# Patient Record
Sex: MALE | Race: WHITE
[De-identification: names, ages, dates, MRNs, and addresses within clinical notes are randomized per-mention and may not be internally consistent; named-entity substitution may affect disease eponyms.]

---

## 2020-11-06 ENCOUNTER — HOSPITAL ENCOUNTER (OUTPATIENT)
Dept: HOSPITAL 96 - M.CL | Age: 40
End: 2020-11-06
Attending: INTERNAL MEDICINE
Payer: COMMERCIAL

## 2020-11-06 VITALS — DIASTOLIC BLOOD PRESSURE: 71 MMHG | SYSTOLIC BLOOD PRESSURE: 116 MMHG

## 2020-11-06 VITALS — SYSTOLIC BLOOD PRESSURE: 111 MMHG | DIASTOLIC BLOOD PRESSURE: 78 MMHG

## 2020-11-06 VITALS — SYSTOLIC BLOOD PRESSURE: 115 MMHG | DIASTOLIC BLOOD PRESSURE: 75 MMHG

## 2020-11-06 VITALS — SYSTOLIC BLOOD PRESSURE: 17 MMHG | DIASTOLIC BLOOD PRESSURE: 72 MMHG

## 2020-11-06 VITALS — BODY MASS INDEX: 27.13 KG/M2 | WEIGHT: 179 LBS | HEIGHT: 68 IN

## 2020-11-06 VITALS — DIASTOLIC BLOOD PRESSURE: 88 MMHG | SYSTOLIC BLOOD PRESSURE: 118 MMHG

## 2020-11-06 VITALS — SYSTOLIC BLOOD PRESSURE: 129 MMHG | DIASTOLIC BLOOD PRESSURE: 76 MMHG

## 2020-11-06 VITALS — DIASTOLIC BLOOD PRESSURE: 55 MMHG | SYSTOLIC BLOOD PRESSURE: 109 MMHG

## 2020-11-06 VITALS — SYSTOLIC BLOOD PRESSURE: 120 MMHG | DIASTOLIC BLOOD PRESSURE: 78 MMHG

## 2020-11-06 DIAGNOSIS — I35.1: ICD-10-CM

## 2020-11-06 DIAGNOSIS — Z98.890: ICD-10-CM

## 2020-11-06 DIAGNOSIS — R01.1: ICD-10-CM

## 2020-11-06 DIAGNOSIS — Z20.828: ICD-10-CM

## 2020-11-06 DIAGNOSIS — Z79.899: ICD-10-CM

## 2020-11-06 DIAGNOSIS — R07.1: Primary | ICD-10-CM

## 2020-11-06 DIAGNOSIS — F17.210: ICD-10-CM

## 2020-11-06 DIAGNOSIS — E78.00: ICD-10-CM

## 2020-11-06 DIAGNOSIS — Z72.89: ICD-10-CM

## 2020-11-06 LAB
ALBUMIN SERPL-MCNC: 4 G/DL (ref 3.4–5)
ALP SERPL-CCNC: 88 U/L (ref 46–116)
ALT SERPL-CCNC: 44 U/L (ref 30–65)
ANION GAP SERPL CALC-SCNC: 8 MMOL/L (ref 7–16)
APTT BLD: 25.2 SECONDS (ref 25–31.3)
AST SERPL-CCNC: 26 U/L (ref 15–37)
BILIRUB SERPL-MCNC: 0.6 MG/DL
BUN SERPL-MCNC: 14 MG/DL (ref 7–18)
CALCIUM SERPL-MCNC: 8.9 MG/DL (ref 8.5–10.1)
CHLORIDE SERPL-SCNC: 104 MMOL/L (ref 98–107)
CHOLEST SERPL-MCNC: 226 MG/DL (ref ?–200)
CO2 SERPL-SCNC: 27 MMOL/L (ref 21–32)
CREAT SERPL-MCNC: 1 MG/DL (ref 0.6–1.3)
GLUCOSE SERPL-MCNC: 96 MG/DL (ref 70–99)
HCT VFR BLD CALC: 47.2 % (ref 42–52)
HDLC SERPL-MCNC: 37 MG/DL (ref 40–?)
HGB BLD-MCNC: 16.2 GM/DL (ref 14–18)
INR PPP: 1
LDLC SERPL-MCNC: 161 MG/DL (ref ?–100)
MCH RBC QN AUTO: 32.3 PG (ref 26–34)
MCHC RBC AUTO-ENTMCNC: 34.3 G/DL (ref 28–37)
MCV RBC: 94.2 FL (ref 80–100)
MPV: 7.5 FL. (ref 7.2–11.1)
PLATELET COUNT*: 299 THOU/UL (ref 150–400)
POTASSIUM SERPL-SCNC: 4.2 MMOL/L (ref 3.5–5.1)
PROT SERPL-MCNC: 8 G/DL (ref 6.4–8.2)
PROTHROMBIN TIME: 10.3 SECONDS (ref 9.2–11.5)
RBC # BLD AUTO: 5.01 MIL/UL (ref 4.5–6)
RDW-CV: 13.1 % (ref 10.5–14.5)
SODIUM SERPL-SCNC: 139 MMOL/L (ref 136–145)
TC:HDL: 6.1 RATIO
TRIGL SERPL-MCNC: 142 MG/DL (ref ?–150)
VLDLC SERPL CALC-MCNC: 28 MG/DL (ref ?–40)
WBC # BLD AUTO: 9.8 THOU/UL (ref 4–11)

## 2020-11-06 NOTE — CARD
[address]  [address]                    CARDIAC CATH REPORT           
_______________________________________________________________________________
 
Name:       BRAVOENEDINAKEVYN LINDSEY TAM       Room:                      REG CLI 
M.RSilvina#:  [id number]      Account #:      [id number]  
Admission:  11/06/20     Attend Phys:    Tino Ordaz MD, F
Discharge:               Date of Birth:  [birth year]  
         Report #: [id number]
                                                                     [id number]
_______________________________________________________________________________
THIS REPORT FOR:  //name//                      
 
cc:  CHRISTOPHER - No family physician/PCP 
     CHRISTOPHER - No family physician/PCP                                        ~
 
--------------- APPROVED REPORT --------------
 
 
Study performed:  11/06/2020 10:51:17
 
Patient Details
Patient Status: Out-Patient                  Room #: 
The patient is a 40 year-old male
 
Event Personnel
Tino Ordaz  Interventional Cardiologist, Brennan Graf RN 
Circulator, Gregory Steele RTR Scrub, Mely Hardy RTR 
Monitor
 
Procedures Performed
Art Access - R radial artery,  Left Heart Cath w/or w/o Coronaries 
LHC, Supravalvular Aortography Injection ISVA, Hemostasis with Vasc 
band
 
Indication
Valvular heart disease
 
Risk Factors
Tobacco History ()
 
Admission/Lab Medications/Medications given during procedure
Heparin Unfract., Nitroglycerin  mcg, Verapamil IA 5 mg, 
Heparin IV 4000 units
 
Procedure Narrative
The patient was brought electively to the Cardiac Catheterization 
Laboratory and was prepped and draped in a sterile manner. The right 
wrist was infiltrated with 2% Lidocaine subcutaneous anesthesia. A 6F 
Slender Glide sheath was inserted into the right radial artery. 
Coronary angiography was performed using coronary diagnostic 
catheters. The right coronary system was accessed and visualized with 
a 6F JR4 catheter. The left coronary system was accessed and 
visualized with a 5F Tiger 4.0 catheter. The left ventricle was 
accessed and visualized with a 6F Straight Pigtail catheter. Left 
ventricular/Aortic Valve gradient assessed via catheter pullback. 
Left ventriculogram was performed in CRUZ projection. An aortogram of 
 
 
 
[place] [address]                    CARDIAC CATH REPORT           
_______________________________________________________________________________
 
Name:       KEVYN JHAVERI II       Room:                      REG CLI 
M.R.#:  [id number]      Account #:      [id number]  
Admission:  11/06/20     Attend Phys:    Tino Ordaz MD, F
Discharge:               Date of Birth:  [birth year]  
         Report #: [id number]
                                                                     [id number]
_______________________________________________________________________________
the ascending aorta was performed. Closure device was deployed with a 
6 Fr Vasc-Band Reg 24cm. The patient tolerated the procedure well and 
there were no complications associated with the procedure. There was 
no hematoma. Unable to cannulate left main with JL4, JL5, nor Amplatz 
left I catheter. 
 
Intraoperative Conscious Sedation
Sedation start time:  11:10           Case end Time:  
11:51    
Fentanyl  50 mcg    Versed  6 mg  
 
Fluoro Time:    11.8 minutes     
Dose:     DAP 94744 cGycm2  1453 mGy  
Contrast Type and Amount:  Visipaque 170 ml    
 
Coronary Angiography
The patient's coronary anatomy is co- dominant. 
 
Native Artery Percent Stenosis
Left Main: 0 %      Prox LAD: 0 %      Mid/Distal LAD: 60 %      
Circumflex: 0 %      RCA: 0 %      Ramus:  %
 
Left Ventriculography
The left ventricle is normal in size with normal contractility. The 
left ventricular ejection fraction is estimated to be 50-55%. Left 
ventricular wall motion abnormalities are not present. There is no 
mitral insufficiency. Aortic root injection revealed mildy dilated 
aortic root with moderate aortic insufficiency.
 
Hemodynamics
The aortic pressure is 106/68 mmHg with a mean of 81 mmHg. The left 
ventricular pressure is 109/8 mmHg with a mean of mmHg. The left 
ventricular end diastolic pressure is 8 mmHg. There was no gradient 
across the aortic valve upon pullback. Pullback from the left 
ventricle to the aorta revealed no gradient across the aortic 
valve.
 
Conclusion
1.  60% stenosis noted in the mid lad
2.  LVEF 50-55%
3.  moderate aortic insufficiency 
 
 
 
 
[place] [address]                    CARDIAC CATH REPORT           
_______________________________________________________________________________
 
Name:       KEVYN JHAVERI II       Room:                      REG CLI 
M.R.#:  [id number]      Account #:      [id number]  
Admission:  11/06/20     Attend Phys:    Tino Ordaz MD, F
Discharge:               Date of Birth:  [birth year]  
         Report #: [id number]
                                                                     [id number]
_______________________________________________________________________________
Recommendations
repeat echo in 1 year
 
 
 
 
 
 
 
 
 
 
 
 
 
 
 
 
 
 
 
 
 
 
 
 
 
 
 
 
 
 
 
 
 
 
 
 
 
 
 
 
 
 
<ELECTRONICALLY SIGNED>
                                        By:  Tino Ordaz MD, Located within [facility]      
11/06/20 1700
D: 11/06/20 1700_______________________________________
T: 11/06/20 1700David JACQUE Ordaz MD, FACC         /INF

## 2020-11-06 NOTE — [place] [address]
Phone:  [phone]                     ELECTROCARDIOGRAM REPORT      
_______________________________________________________________________________
 
Name:         KEVYN JHAVERI II      Room:                     REG CLI
M.R.#:    [id number]     Account #:     [id number]  
Admission:    20    Attend Phys:   Tino Ordaz MD
Discharge:                Date of Birth: [birth year]  
Date of Service: 20 1013  Report #:      [id number]
        [id number]
_______________________________________________________________________________
THIS REPORT FOR:  //name//                      
 
                          [facility]
                                       
Test Date:    2020               Test Time:    10:13:52
Pat Name:     KEVYN JHAVERI          Department:   
Patient ID:   [id number]            Room:          
Gender:       M                        Technician:   
:          1980               Requested By: Tino Ordaz
Order Number: [id number]    Reading MD:   Tino rOdaz
                                 Measurements
Intervals                              Axis          
Rate:         72                       P:            -16
NH:           168                      QRS:          2
QRSD:         85                       T:            45
QT:           389                                    
QTc:          426                                    
                           Interpretive Statements
Sinus rhythm
No previous ECG available for comparison
Electronically Signed On 2020 14:26:57 CST by Tino Ordaz
[email or web address]
 
 
 
 
 
 
 
 
 
 
 
 
 
 
 
 
 
 
 
 
 
 
  <ELECTRONICALLY SIGNED>
                                           By: Tino Ordaz MD, [facility]      
  20     1426
D: 20 1013   _____________________________________
T: 20 1013   Tino Ordaz MD, FAC        /EPI